# Patient Record
Sex: MALE
[De-identification: names, ages, dates, MRNs, and addresses within clinical notes are randomized per-mention and may not be internally consistent; named-entity substitution may affect disease eponyms.]

---

## 2023-04-27 ENCOUNTER — APPOINTMENT (OUTPATIENT)
Dept: GASTROENTEROLOGY | Facility: CLINIC | Age: 63
End: 2023-04-27

## 2023-08-31 ENCOUNTER — APPOINTMENT (OUTPATIENT)
Dept: GASTROENTEROLOGY | Facility: CLINIC | Age: 63
End: 2023-08-31

## 2024-05-13 ENCOUNTER — APPOINTMENT (OUTPATIENT)
Dept: RADIOLOGY | Facility: CLINIC | Age: 64
End: 2024-05-13
Payer: COMMERCIAL

## 2024-05-13 ENCOUNTER — OUTPATIENT (OUTPATIENT)
Dept: OUTPATIENT SERVICES | Facility: HOSPITAL | Age: 64
LOS: 1 days | End: 2024-05-13

## 2024-05-13 ENCOUNTER — APPOINTMENT (OUTPATIENT)
Dept: INTERNAL MEDICINE | Facility: CLINIC | Age: 64
End: 2024-05-13
Payer: COMMERCIAL

## 2024-05-13 ENCOUNTER — NON-APPOINTMENT (OUTPATIENT)
Age: 64
End: 2024-05-13

## 2024-05-13 VITALS
RESPIRATION RATE: 16 BRPM | HEART RATE: 72 BPM | BODY MASS INDEX: 26.33 KG/M2 | HEIGHT: 73.5 IN | WEIGHT: 203 LBS | TEMPERATURE: 97.3 F | DIASTOLIC BLOOD PRESSURE: 75 MMHG | SYSTOLIC BLOOD PRESSURE: 127 MMHG | OXYGEN SATURATION: 97 %

## 2024-05-13 DIAGNOSIS — Z82.49 FAMILY HISTORY OF ISCHEMIC HEART DISEASE AND OTHER DISEASES OF THE CIRCULATORY SYSTEM: ICD-10-CM

## 2024-05-13 DIAGNOSIS — Z78.9 OTHER SPECIFIED HEALTH STATUS: ICD-10-CM

## 2024-05-13 DIAGNOSIS — R06.02 SHORTNESS OF BREATH: ICD-10-CM

## 2024-05-13 DIAGNOSIS — K21.9 GASTRO-ESOPHAGEAL REFLUX DISEASE W/OUT ESOPHAGITIS: ICD-10-CM

## 2024-05-13 DIAGNOSIS — N52.8 OTHER MALE ERECTILE DYSFUNCTION: ICD-10-CM

## 2024-05-13 DIAGNOSIS — Z87.891 PERSONAL HISTORY OF NICOTINE DEPENDENCE: ICD-10-CM

## 2024-05-13 DIAGNOSIS — Z00.00 ENCOUNTER FOR GENERAL ADULT MEDICAL EXAMINATION W/OUT ABNORMAL FINDINGS: ICD-10-CM

## 2024-05-13 DIAGNOSIS — Z63.5 DISRUPTION OF FAMILY BY SEPARATION AND DIVORCE: ICD-10-CM

## 2024-05-13 PROCEDURE — 71046 X-RAY EXAM CHEST 2 VIEWS: CPT | Mod: 26

## 2024-05-13 PROCEDURE — 36415 COLL VENOUS BLD VENIPUNCTURE: CPT

## 2024-05-13 PROCEDURE — 99204 OFFICE O/P NEW MOD 45 MIN: CPT | Mod: 25

## 2024-05-13 PROCEDURE — 93000 ELECTROCARDIOGRAM COMPLETE: CPT

## 2024-05-13 RX ORDER — SILDENAFIL 100 MG/1
100 TABLET, FILM COATED ORAL DAILY
Qty: 90 | Refills: 3 | Status: ACTIVE | COMMUNITY
Start: 2024-05-13 | End: 1900-01-01

## 2024-05-13 RX ORDER — OMEPRAZOLE 40 MG/1
40 CAPSULE, DELAYED RELEASE ORAL
Qty: 30 | Refills: 11 | Status: ACTIVE | COMMUNITY
Start: 2024-05-13 | End: 1900-01-01

## 2024-05-13 SDOH — SOCIAL STABILITY - SOCIAL INSECURITY: DISRUPTION OF FAMILY BY SEPARATION AND DIVORCE: Z63.5

## 2024-05-16 ENCOUNTER — TRANSCRIPTION ENCOUNTER (OUTPATIENT)
Age: 64
End: 2024-05-16

## 2024-05-16 LAB
BASOPHILS # BLD AUTO: 0.03 K/UL
BASOPHILS NFR BLD AUTO: 0.4 %
EOSINOPHIL # BLD AUTO: 0.06 K/UL
EOSINOPHIL NFR BLD AUTO: 0.8 %
HCT VFR BLD CALC: 43.6 %
HGB BLD-MCNC: 14.2 G/DL
IMM GRANULOCYTES NFR BLD AUTO: 0.3 %
LYMPHOCYTES # BLD AUTO: 2.43 K/UL
LYMPHOCYTES NFR BLD AUTO: 32.4 %
MAN DIFF?: NORMAL
MCHC RBC-ENTMCNC: 29.5 PG
MCHC RBC-ENTMCNC: 32.6 GM/DL
MCV RBC AUTO: 90.6 FL
MONOCYTES # BLD AUTO: 0.66 K/UL
MONOCYTES NFR BLD AUTO: 8.8 %
NEUTROPHILS # BLD AUTO: 4.29 K/UL
NEUTROPHILS NFR BLD AUTO: 57.3 %
PLATELET # BLD AUTO: 215 K/UL
RBC # BLD: 4.81 M/UL
RBC # FLD: 12.6 %
WBC # FLD AUTO: 7.49 K/UL

## 2024-05-17 ENCOUNTER — TRANSCRIPTION ENCOUNTER (OUTPATIENT)
Age: 64
End: 2024-05-17

## 2024-05-17 NOTE — HEALTH RISK ASSESSMENT
[0] : 2) Feeling down, depressed, or hopeless: Not at all (0) [PHQ-2 Negative - No further assessment needed] : PHQ-2 Negative - No further assessment needed [Former] : Former [5-9] : 5-9 [[id number]] : 0 [de-identified] : quit 35 years ago, from age [age], pack a day

## 2024-05-17 NOTE — PLAN
[FreeTextEntry1] : Shortness of breath on exertion Started after an infection Likely postinfectious reactive airway Recommend chest x-ray to assess lung parenchyma Will rule out anemia as a secondary cause Patient also reports element of deconditioning I have advised him to try to ramp up exercise and physical activity. If problems persist recommend pulmonary medicine evaluation From a cardiac perspective patient is EKG is completely normal and denies chest pain and has no elements of CHF on physical exam  ED counseling given on avoiding concomitant use of Nitrates, Guanylate Cyclase (GC) Stimulators. Info given on Cardiovascular Considerations, Concomitant Use with Drugs Which Lower Blood Pressure, Potential for Drug Interactions When Taking Cialis for Once Daily Use, risk of Priapism and sudden loss of vision and necessity to seek medical attention, Patients made aware that both alcohol and Cialis, a PDE5 inhibitor, act as mild vasodilators. safe sex counseling given for Sexually Transmitted Disease.

## 2024-05-17 NOTE — HISTORY OF PRESENT ILLNESS
[de-identified] : 63 years old male patient came in to establish care and address concern of shortness of breath and address other chronic issues of GERD and ED  patient report he had the flu end of March after returning from ski trip, wasnt teted for the flu patient assuming, had SOB, was prescribed an inhaler and course of steroids but hasnt completely recovered. Still appreciating some shortness of breath  Patient with GERD diagnosis, gets occasional heartburn, takes omeprazole 40 mg daily, reports good benefit, denies adverse reactions, has an established gastroenterologist and had recent upper and lower endoscopies.  Patient with ED complaint, takes sildenafil 100 mg as needed, reports good benefit, denies adverse reactions, denies use of nitrate products, requesting a refill  runs an investment company

## 2024-05-17 NOTE — PHYSICAL EXAM
[No Respiratory Distress] : no respiratory distress  [No Accessory Muscle Use] : no accessory muscle use [Normal Rate] : normal rate  [Regular Rhythm] : with a regular rhythm [Normal S1, S2] : normal S1 and S2 [No Edema] : there was no peripheral edema [No Extremity Clubbing/Cyanosis] : no extremity clubbing/cyanosis

## 2024-05-30 ENCOUNTER — TRANSCRIPTION ENCOUNTER (OUTPATIENT)
Age: 64
End: 2024-05-30

## 2024-07-01 ENCOUNTER — APPOINTMENT (OUTPATIENT)
Dept: DERMATOLOGY | Facility: CLINIC | Age: 64
End: 2024-07-01

## 2024-08-07 ENCOUNTER — APPOINTMENT (OUTPATIENT)
Dept: PULMONOLOGY | Facility: CLINIC | Age: 64
End: 2024-08-07

## 2025-06-04 ENCOUNTER — NON-APPOINTMENT (OUTPATIENT)
Age: 65
End: 2025-06-04

## 2025-06-06 ENCOUNTER — NON-APPOINTMENT (OUTPATIENT)
Age: 65
End: 2025-06-06

## 2025-06-06 ENCOUNTER — APPOINTMENT (OUTPATIENT)
Dept: INTERNAL MEDICINE | Facility: CLINIC | Age: 65
End: 2025-06-06
Payer: COMMERCIAL

## 2025-06-06 VITALS
BODY MASS INDEX: 26.05 KG/M2 | SYSTOLIC BLOOD PRESSURE: 135 MMHG | WEIGHT: 203 LBS | DIASTOLIC BLOOD PRESSURE: 87 MMHG | HEART RATE: 59 BPM | TEMPERATURE: 97.6 F | OXYGEN SATURATION: 97 % | HEIGHT: 74 IN

## 2025-06-06 PROCEDURE — 99397 PER PM REEVAL EST PAT 65+ YR: CPT

## 2025-06-06 PROCEDURE — 36415 COLL VENOUS BLD VENIPUNCTURE: CPT

## 2025-06-06 PROCEDURE — 93000 ELECTROCARDIOGRAM COMPLETE: CPT

## 2025-06-06 PROCEDURE — 99214 OFFICE O/P EST MOD 30 MIN: CPT | Mod: 25

## 2025-06-06 RX ORDER — TADALAFIL 5 MG/1
5 TABLET ORAL
Qty: 90 | Refills: 3 | Status: ACTIVE | COMMUNITY
Start: 2025-06-06 | End: 1900-01-01

## 2025-06-11 ENCOUNTER — TRANSCRIPTION ENCOUNTER (OUTPATIENT)
Age: 65
End: 2025-06-11

## 2025-06-11 LAB
ALBUMIN SERPL ELPH-MCNC: 4.4 G/DL
ALP BLD-CCNC: 47 U/L
ALT SERPL-CCNC: 28 U/L
ANION GAP SERPL CALC-SCNC: 13 MMOL/L
APPEARANCE: CLEAR
AST SERPL-CCNC: 27 U/L
BACTERIA: NEGATIVE /HPF
BASOPHILS # BLD AUTO: 0.03 K/UL
BASOPHILS NFR BLD AUTO: 0.5 %
BILIRUB SERPL-MCNC: 0.4 MG/DL
BILIRUBIN URINE: NEGATIVE
BLOOD URINE: NEGATIVE
BUN SERPL-MCNC: 17 MG/DL
C TRACH RRNA SPEC QL NAA+PROBE: NOT DETECTED
CALCIUM SERPL-MCNC: 9.4 MG/DL
CAST: 1 /LPF
CHLORIDE SERPL-SCNC: 104 MMOL/L
CHOLEST SERPL-MCNC: 200 MG/DL
CO2 SERPL-SCNC: 21 MMOL/L
COLOR: YELLOW
CREAT SERPL-MCNC: 0.82 MG/DL
EGFRCR SERPLBLD CKD-EPI 2021: 97 ML/MIN/1.73M2
EOSINOPHIL # BLD AUTO: 0.14 K/UL
EOSINOPHIL NFR BLD AUTO: 2.2 %
EPITHELIAL CELLS: 0 /HPF
ESTIMATED AVERAGE GLUCOSE: 117 MG/DL
GLUCOSE QUALITATIVE U: NEGATIVE MG/DL
GLUCOSE SERPL-MCNC: 96 MG/DL
HBA1C MFR BLD HPLC: 5.7 %
HCT VFR BLD CALC: 43.1 %
HCV AB SER QL: NONREACTIVE
HCV S/CO RATIO: 0.23 S/CO
HDLC SERPL-MCNC: 59 MG/DL
HGB BLD-MCNC: 13.9 G/DL
HIV1+2 AB SPEC QL IA.RAPID: NONREACTIVE
IMM GRANULOCYTES NFR BLD AUTO: 0.2 %
KETONES URINE: NEGATIVE MG/DL
LDLC SERPL-MCNC: 122 MG/DL
LEUKOCYTE ESTERASE URINE: NEGATIVE
LYMPHOCYTES # BLD AUTO: 2.44 K/UL
LYMPHOCYTES NFR BLD AUTO: 38.9 %
MAN DIFF?: NORMAL
MCHC RBC-ENTMCNC: 29.7 PG
MCHC RBC-ENTMCNC: 32.3 G/DL
MCV RBC AUTO: 92.1 FL
MICROSCOPIC-UA: NORMAL
MONOCYTES # BLD AUTO: 0.53 K/UL
MONOCYTES NFR BLD AUTO: 8.5 %
N GONORRHOEA RRNA SPEC QL NAA+PROBE: NOT DETECTED
NEUTROPHILS # BLD AUTO: 3.12 K/UL
NEUTROPHILS NFR BLD AUTO: 49.7 %
NITRITE URINE: NEGATIVE
NONHDLC SERPL-MCNC: 141 MG/DL
PH URINE: 5.5
PLATELET # BLD AUTO: 209 K/UL
POTASSIUM SERPL-SCNC: 4.6 MMOL/L
PROT SERPL-MCNC: 7 G/DL
PROTEIN URINE: NEGATIVE MG/DL
PSA FREE FLD-MCNC: 38 %
PSA FREE SERPL-MCNC: 0.29 NG/ML
PSA SERPL-MCNC: 0.75 NG/ML
RBC # BLD: 4.68 M/UL
RBC # FLD: 12.4 %
RED BLOOD CELLS URINE: 1 /HPF
SODIUM SERPL-SCNC: 139 MMOL/L
SOURCE AMPLIFICATION: NORMAL
SPECIFIC GRAVITY URINE: 1.02
T PALLIDUM AB SER QL IA: NEGATIVE
TRIGL SERPL-MCNC: 106 MG/DL
UROBILINOGEN URINE: 0.2 MG/DL
WBC # FLD AUTO: 6.27 K/UL
WHITE BLOOD CELLS URINE: 0 /HPF

## 2025-06-16 ENCOUNTER — APPOINTMENT (OUTPATIENT)
Dept: GASTROENTEROLOGY | Facility: CLINIC | Age: 65
End: 2025-06-16
Payer: COMMERCIAL

## 2025-06-16 VITALS
DIASTOLIC BLOOD PRESSURE: 62 MMHG | TEMPERATURE: 95.8 F | HEIGHT: 74 IN | BODY MASS INDEX: 26.31 KG/M2 | RESPIRATION RATE: 16 BRPM | SYSTOLIC BLOOD PRESSURE: 115 MMHG | WEIGHT: 205 LBS | OXYGEN SATURATION: 98 % | HEART RATE: 78 BPM

## 2025-06-16 PROCEDURE — 99205 OFFICE O/P NEW HI 60 MIN: CPT

## 2025-06-19 ENCOUNTER — TRANSCRIPTION ENCOUNTER (OUTPATIENT)
Age: 65
End: 2025-06-19